# Patient Record
Sex: FEMALE | URBAN - METROPOLITAN AREA
[De-identification: names, ages, dates, MRNs, and addresses within clinical notes are randomized per-mention and may not be internally consistent; named-entity substitution may affect disease eponyms.]

---

## 2024-04-22 ENCOUNTER — APPOINTMENT (RX ONLY)
Dept: URBAN - METROPOLITAN AREA CLINIC 101 | Facility: CLINIC | Age: 64
Setting detail: DERMATOLOGY
End: 2024-04-22

## 2024-04-22 VITALS — HEIGHT: 67 IN | WEIGHT: 135 LBS

## 2024-04-22 PROCEDURE — ? ADDITIONAL NOTES

## 2024-04-22 NOTE — PROCEDURE: ADDITIONAL NOTES
Additional Notes: Patient's concerns include: \\n- Forehead/lateral brow aging \\n- Lower face/neck aging \\n\\n\\nFactors altering surgical decisions (hx/exam findings): \\n- Lateral brow positioned just below the orbital rim\\n- Moderate lower face/neck aging with jowling and skin laxity\\n- Platysma banding present \\n- Non-smoker\\n\\n\\nProposed intervention(s): \\n- Recommend standard facelift to address the face/neck aging including submental incision for platysmal plication as well as a temporal brow lift to address lateral brow position. Discussed expectations following surgery including initial ecchymosis and edema with the final result being seen at 6-9 months with final surgical results typically lasting 5-10 years. Discussed use of drains and chin strap use during postop period. \\n- Discussed all procedures in detail including the planned incision patterns and postop course including suture removal timing, activity restrictions x4-6 weeks, and drain placement. Plan for POD#1 postop check to remove head wrap and possibly both drains.\\n- Discussed risks, benefits, and alternatives for the discussed procedures with the risks including infection, bleeding, scar, need for future surgery, damage to nearby structures, asymmetry, edema/ecchymosis, pain, hematoma, seroma, pain, poor cosmetic result, skin necrosis, nerve injury, earlobe deformity, skin necrosis, delayed wound healing, hair loss, nerve injury, parotid fistula, return of aging changes, distortion of anatomic landmarks, incomplete correction, nerve injury, numbness, and skin sloughing.\\n- Patient was provided with pricing info for the procedures discussed \\n\\n\\n\\nI counseled the patient regarding the following:\\n- Aging Face Care: Many options exist for improving the appearance of the aging face. These include topical skin care products, prescription-grade skin care with tretinoin and/or hydroquinone for complexion improvement, fillers and neurotoxin injections to help correct wrinkles, laser procedures for resurfacing and complexion improvement, and surgical procedures. Many of these modalities may be combined for a comprehensive individualized treatment plan. Patients should limit sun exposure and wear sunblock with an SPF of at least 30, with regular reapplications, to minimize sun damage, which can exacerbate the appearance of facial aging. Diet, exercise, sleep and social activity are also important to maintaining a youthful appearance and healthy body. Smoking and nicotine use should be eliminated; this may sometimes require the assistance of the patient's primary care physician and support system, and nicotine weaning can often be facilitated by the use of medications to control the addictive urge for nicotine.\\n- Expectations: Aging is a natural process and is inevitable. However, limiting sun exposure, alcohol intake, drug use, and nicotine consumption are important factors to helping keep the patient younger-looking and vigorous. Recommended treatment regimens will have accompanying specific instructions for use and aftercare. Some signs of facial aging may be reversible while others may be improved, and others may not be correctable. There is no guarantee or warranty given or implied regarding the degree of correction of facial aging signs, or satisfaction with the final outcome. Results may take weeks to months to be fully realized, and longevity of results is variable among individual patients. Multiple procedures and products may be offered as part of a menu of options.
Render Risk Assessment In Note?: no

## 2025-05-19 ENCOUNTER — APPOINTMENT (OUTPATIENT)
Dept: URBAN - METROPOLITAN AREA CLINIC 101 | Facility: CLINIC | Age: 65
Setting detail: DERMATOLOGY
End: 2025-05-19

## 2025-05-19 PROCEDURE — ? ADDITIONAL NOTES

## 2025-05-19 NOTE — PROCEDURE: ADDITIONAL NOTES
Additional Notes: Patient's concerns include: \\n-  Deep facial lines\\n- Facial sun damage and hyperpigmentation\\n\\n\\nFactors altering surgical decisions (hx/exam findings): \\n- Photodamage of face/neck\\n- Deep facial wrinkles most prominent on the lower face and periorbital area\\n- Moderate lower face/neck aging with jowling and skin laxity \\n\\n\\nProposed intervention(s): \\n- Recommend full face CO2 laser to improve facial rhytids/creasing and photo-aging. Discussed expectations following laser including expected healing course and the final result being seen at 6-9 months. Discussed expected results and that the laser will not completely resolve all her skin damage, hyperpigmentation, and/or scarring, which the patient understands. Patient will need to avoid the sun for 1 month prior to treatment. Discussed the recommended pre-laser protocol, which includes specific skin care products to help prevent post-laser hyperpigmentation. Patient will need PCP clearance prior to undergoing laser treatment with sedation.\\n- Need to stop use of topical retinoids 1-2 weeks prior to treatment. Discussed that the patient will need to take the full course of prescribed antibiotics starting after the treatment as well as the prescribed antivirals, which will be started 2 days before the laser treatment. \\n- Discussed laser resurfacing in detail including the expected recovery timeline with skin sloughing for first week and ongoing redness for up to 12 weeks. Discussed risk of post-inflammatory hyperpigmentation and hypopigmentation. \\n- Discussed postop care with use of sunblock, no use of topical retinoids or alpha/beta acids during first week after treatment, and plan for POD#1 follow up. Discussed that patient needs to avoid the sun until the area is fully healed. Discussed importance of avoiding nicotine to optimize healing\\n- Discussed the risks and benefits of CO2 laser resurfacing including but not limited to temporary or permanent lightening or darkening of treated skin, crusting, scabbing, scarring, bleeding, need for future surgery, damage to nearby structures, reactivation of cold sores, infection, scarring persistent redness, acne flares, milia formation, edema/ecchymosis, pain, ectropion, prolonged redness, demarcation lines, pain, poor cosmetic result, delayed wound healing, return of aging changes, incomplete correction, skin sloughing, and hypopigmentation/hyperpigmentation. . Strict sun avoidance and protection emphasized. Discussed post-operative pain, swelling and redness after laser treatment. No guarantees can be made and results vary from patient to patient. \\n- Patient provided pricing information for laser treatment\\n- Discuss pre-laser protocol, which includes specific skin care products to help prevent post-laser hyperpigmentation and helps prepare the skin for the treatment\\n- Patient will need PCP clearance prior to undergoing laser treatment with sedation.\\n\\n\\n\\n\\n\\nI counseled the patient regarding the following:\\n- Aging Face Care: Many options exist for improving the appearance of the aging face. These include topical skin care products, prescription-grade skin care with tretinoin and/or hydroquinone for complexion improvement, fillers and neurotoxin injections to help correct wrinkles, laser procedures for resurfacing and complexion improvement, and surgical procedures. Many of these modalities may be combined for a comprehensive individualized treatment plan. Patients should limit sun exposure and wear sunblock with an SPF of at least 30, with regular reapplications, to minimize sun damage, which can exacerbate the appearance of facial aging. Diet, exercise, sleep and social activity are also important to maintaining a youthful appearance and healthy body. Smoking and nicotine use should be eliminated; this may sometimes require the assistance of the patient's primary care physician and support system, and nicotine weaning can often be facilitated by the use of medications to control the addictive urge for nicotine.\\n- Expectations: Aging is a natural process and is inevitable. However, limiting sun exposure, alcohol intake, drug use, and nicotine consumption are important factors to helping keep the patient younger-looking and vigorous. Recommended treatment regimens will have accompanying specific instructions for use and aftercare. Some signs of facial aging may be reversible while others may be improved, and others may not be correctable. There is no guarantee or warranty given or implied regarding the degree of correction of facial aging signs, or satisfaction with the final outcome. Results may take weeks to months to be fully realized, and longevity of results is variable among individual patients. Multiple procedures and products may be offered as part of a menu of options.
Render Risk Assessment In Note?: no